# Patient Record
Sex: MALE | Race: WHITE
[De-identification: names, ages, dates, MRNs, and addresses within clinical notes are randomized per-mention and may not be internally consistent; named-entity substitution may affect disease eponyms.]

---

## 2021-04-20 ENCOUNTER — HOSPITAL ENCOUNTER (OUTPATIENT)
Dept: HOSPITAL 47 - RADMRIMAIN | Age: 66
Discharge: HOME | End: 2021-04-20
Attending: ORTHOPAEDIC SURGERY
Payer: COMMERCIAL

## 2021-04-20 DIAGNOSIS — R22.2: Primary | ICD-10-CM

## 2021-04-20 PROCEDURE — 71552 MRI CHEST W/O & W/DYE: CPT

## 2021-04-20 NOTE — MR
MR chest with and without contrast

 

HISTORY: Localized swelling, mass, lump, R 22.9

 

Multiplanar multisequence and postcontrast images obtained through the chest attention to the left ax
illa, patient received 6.5 cc Gadavist IV

 

Correlation CT chest 2/17/2021

 

In the left axilla there is an oval focus which shows fat signal on all pulse sequences and measures 
approximately 5.7 x 1.7 x 6 cm in cephalad to caudal dimension. There are some soft tissue elements a
ssociated with the well encapsulated mass, some enhancement is seen following contrast administration
, some mild increased signal noted on inversion recovery sequences scattered within the area.

 

There are some nonenlarged lymph nodes present in the left axilla. Visualized bone marrow signal is u
nremarkable. There is no evident abnormality of the chest wall. No pleural effusion. Some arthropathy
 is present within the shoulder.

 

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 There may be some local inflammatory changes prese
nt. Liposarcoma is felt to be unlikely. Consider follow-up, orthopedic oncology consult as indicated.

## 2023-08-03 ENCOUNTER — HOSPITAL ENCOUNTER (OUTPATIENT)
Dept: HOSPITAL 47 - RADCTMAIN | Age: 68
Discharge: HOME | End: 2023-08-03
Attending: THORACIC SURGERY (CARDIOTHORACIC VASCULAR SURGERY)
Payer: MEDICARE

## 2023-08-03 DIAGNOSIS — R91.8: ICD-10-CM

## 2023-08-03 DIAGNOSIS — I71.21: Primary | ICD-10-CM

## 2023-08-03 LAB — BUN SERPL-SCNC: 17 MG/DL (ref 9–20)

## 2023-08-03 PROCEDURE — 71275 CT ANGIOGRAPHY CHEST: CPT

## 2023-08-03 PROCEDURE — 36415 COLL VENOUS BLD VENIPUNCTURE: CPT

## 2023-08-03 PROCEDURE — 84520 ASSAY OF UREA NITROGEN: CPT

## 2023-08-03 PROCEDURE — 82565 ASSAY OF CREATININE: CPT

## 2023-08-03 NOTE — CT
EXAMINATION TYPE: CT angio chest

CT DLP: 357.5 mGycm, Automated exposure control for dose reduction was used.

 

DATE OF EXAM: 8/3/2023 10:21 AM

 

COMPARISON: CTA chest 2/15/2022, MR chest 4/20/2021

 

CLINICAL INDICATION:Male, 67 years old with history of I71.20 THORACIC AORTIC ANEURYSM; thoracic aort
ic aneurysm

 

TECHNIQUE/CONTRAST: 

CTA scan of the thorax is performed without and with IV Contrast, patient injected with 100 mL of Iso
marlene 370. 3D reconstructed images are created on an independent workstation and reviewed..  

 

FINDINGS: 

Lungs/Pleura: No evidence of focal consolidation, pleural effusion or pneumothorax. Stable calcified 
granuloma within the medial aspect of the right upper lobe. Lingular 4 mm pulmonary nodule (series 5,
 image 35). Right lower lobe 4 mm pulmonary nodule (series 5 image 48). These are not definitively se
en on prior examination.

Airway: Large airways are patent.

Heart: Heart is within normal limits for size. No pericardial effusion. Moderate coronary arterial ca
lcifications.

Vasculature: Stable ascending aortic aneurysm measuring 4.3 cm (series 6, image 83). The aortic root 
measures up to 3.4 cm which is stable. The inferior portion of the descending thoracic aorta measures
 2.3 cm which is stable. No evidence for intramural hematoma or dissection. No evidence for pulmonary
 embolism.

Mediastinum: No pathologically enlarged lymph nodes. Calcified mediastinal and right hilar lymph node
s.

Musculoskeletal: No acute osseous abnormalities

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
/20/2021

Lower neck: No significant findings.

Upper Abdomen: No significant findings..

 

IMPRESSION:

1.  Stable ascending thoracic aortic aneurysm measuring up to 4.3 cm.

2.  Couple of pulmonary nodules not definitively visualized prior examination measuring up to 4 mm. A
ttention on follow-up examination.

3.  Sequelae of prior granulomatous disease.

## 2024-09-25 ENCOUNTER — HOSPITAL ENCOUNTER (OUTPATIENT)
Dept: HOSPITAL 47 - RADCTMAIN | Age: 69
Discharge: HOME | End: 2024-09-25
Attending: THORACIC SURGERY (CARDIOTHORACIC VASCULAR SURGERY)
Payer: MEDICARE

## 2024-09-25 PROCEDURE — 71250 CT THORAX DX C-: CPT

## 2024-09-25 NOTE — CT
EXAMINATION TYPE: CT chest wo con

 

DATE OF EXAM: 9/25/2024

 

COMPARISON: 8/3/2023

 

HISTORY: Thoracic aortic aneurysm w/o rupture

 

CT DLP: 370 mGycm, Automated exposure control for dose reduction was used.

 

CONTRAST: Performed injected with mL of .

 

TECHNIQUE: Axial images were obtained at 5 mm thick sections.  Reconstructed images are reviewed on [employer] computer in the coronal plane. 

 

FINDINGS: Portion of the thyroid visualized is normal.

 

No suspicious lung nodules or focal infiltrates are present. Granuloma anterior to the aorta anterior
 lung field.

 

No enlarged mediastinal or hilar adenopathy is evident.   

 

The thoracic aorta at the level of the diaphragm is 2.6 cm. Transverse dimension of the aortic arch i
s 3.1 cm. The aortic root is 3.7 cm. The ascending aorta diameter at the level of the main pulmonary 
artery is 4r.2 cm.  The main pulmonary artery diameter at the bifurcation is 2.9 cm. Coronary Artery 
calcifications present.

 

Limited CT sections are obtained through the upper abdomen. Abdomen is essentially unremarkable.

 

IMPRESSION:

1. Stable ascending thoracic aortic aneurysm.

 

[facility], Workstation: [id number], 9/25/2024 10:35 PM